# Patient Record
Sex: FEMALE | Race: WHITE | NOT HISPANIC OR LATINO
[De-identification: names, ages, dates, MRNs, and addresses within clinical notes are randomized per-mention and may not be internally consistent; named-entity substitution may affect disease eponyms.]

---
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
## 2019-11-20 ENCOUNTER — RESULT REVIEW (OUTPATIENT)
Age: 19
End: 2019-11-20

## 2020-08-29 ENCOUNTER — TRANSCRIPTION ENCOUNTER (OUTPATIENT)
Age: 20
End: 2020-08-29

## 2020-11-24 ENCOUNTER — RESULT REVIEW (OUTPATIENT)
Age: 20
End: 2020-11-24

## 2021-05-21 ENCOUNTER — RESULT REVIEW (OUTPATIENT)
Age: 21
End: 2021-05-21

## 2021-12-27 ENCOUNTER — RESULT REVIEW (OUTPATIENT)
Age: 21
End: 2021-12-27

## 2022-06-23 ENCOUNTER — FORM ENCOUNTER (OUTPATIENT)
Age: 22
End: 2022-06-23

## 2022-06-24 ENCOUNTER — APPOINTMENT (OUTPATIENT)
Dept: MRI IMAGING | Facility: CLINIC | Age: 22
End: 2022-06-24
Payer: COMMERCIAL

## 2022-06-24 ENCOUNTER — APPOINTMENT (OUTPATIENT)
Dept: ORTHOPEDIC SURGERY | Facility: CLINIC | Age: 22
End: 2022-06-24
Payer: COMMERCIAL

## 2022-06-24 VITALS — WEIGHT: 163 LBS | HEIGHT: 66 IN | BODY MASS INDEX: 26.2 KG/M2

## 2022-06-24 PROCEDURE — 73721 MRI JNT OF LWR EXTRE W/O DYE: CPT | Mod: RT

## 2022-06-24 PROCEDURE — 73718 MRI LOWER EXTREMITY W/O DYE: CPT | Mod: RT

## 2022-06-24 PROCEDURE — 99214 OFFICE O/P EST MOD 30 MIN: CPT

## 2022-06-24 RX ORDER — NAPROXEN 500 MG/1
500 TABLET ORAL TWICE DAILY
Qty: 60 | Refills: 1 | Status: ACTIVE | COMMUNITY
Start: 2022-06-24 | End: 1900-01-01

## 2022-06-24 NOTE — PHYSICAL EXAM
[de-identified] : Neurologic: normal coordination, normal DTR UE/LE , normal sensation, normal mood and affect, orientated and able to communicate. \par Skin: normal skin, no rash, no ulcers and no lesions. \par Lymphatic: no obvious lymphadenopathy in areas examined. \par Constitutional: well developed and well nourished. \par Cardiovascular: peripheral vascular exam is grossly normal. \par Pulmonary: no respiratory distress, lungs clear to auscultation bilaterally. \par Abdomen: normal bowel sounds, non-tender, no HSM and no mass. \par Gait: Crutches used to ambulate\par \par Right Leg: Unable to straight leg raise\par Pain with abduction\par Pain with external rotation\par Hip Flexion: 30 degrees. Guarded further.\par [place] 6/20/22: 3-view right hip x-ray: Unremarkable.\par \par Right Knee: Questionable defect to suprapatellar region for quadriceps tear\par [place] 6/20/22: 3+-view right knee x-ray: Unremarkable.\par

## 2022-06-24 NOTE — DISCUSSION/SUMMARY
[de-identified] : RTC after PELVIS MRI, RIGHT KNEE MRI, and FEMUR MRI to eval acute traumatic quadriceps tear due to recent injury. \par her pain necessitates all three as she cannot straight leg raise with hip pain, diffuse anterior thigh pain as well as palpable distal quadriceps pain\par in office with crutches\par Naprosyn prescribed\par \par \par -----------------------------------------------\par Home Exercise\par The patient is instructed on a home exercise program.\par \par FIGUEROA BERG Acting as a Scribe for Dr. Escobar\par I, Figueroa Berg, attest that this documentation has been prepared under the direction and in the presence of Provider Adria Escobar MD.\par \par Activity Modification\par The patient was advised to modify their activities.\par \par Dx / Natural History\par The patient was advised of the diagnosis.  The natural history of the pathology was explained in full to the patient in layman's terms.  Several different treatment options were discussed and explained in full to the patient including the risks and benefits of both surgical and non-surgical treatments.  All questions and concerns were answered.\par \par Pain Guide Activities\par The patient was advised to let pain guide the gradual advancement of activities.\par \par RICE\mariella I explained to the patient that rest, ice, compression, and elevation would benefit them.  They may return to activity after follow-up or when they no longer have any pain.

## 2022-06-29 ENCOUNTER — APPOINTMENT (OUTPATIENT)
Dept: ORTHOPEDIC SURGERY | Facility: CLINIC | Age: 22
End: 2022-06-29
Payer: COMMERCIAL

## 2022-06-29 PROCEDURE — 99214 OFFICE O/P EST MOD 30 MIN: CPT

## 2022-06-29 NOTE — DISCUSSION/SUMMARY
[de-identified] : MRI reviewed. All questions and concerns answered.\par Patient will begin PT for rectus femoris tear.\par Continue naprosyn as previously prescribed.\par RICE, pain guide activities, activity modification.\par Fu in 6 weeks. \par \par \par Home Exercise\par The patient is instructed on a home exercise program.\par \par Activity Modification\par The patient was advised to modify their activities.\par \par Dx / Natural History\par The patient was advised of the diagnosis. The natural history of the pathology was explained in full to the patient in layman's terms. Several different treatment options were discussed and explained in full to the patient including the risks and benefits of both surgical and non-surgical treatments. All questions and concerns were answered.\par \par Pain Guide Activities\par The patient was advised to let pain guide the gradual advancement of activities.\par \par RICE \par I explained to the patient that rest, ice, compression, and elevation would benefit them.  They may return to activity after follow-up or when they no longer have any pain.\par \par \par Samir Morton PA-C Acting as a Scribe for Dr. Escobar.\par

## 2022-06-29 NOTE — PHYSICAL EXAM
[de-identified] : Neurologic: normal coordination, normal DTR UE/LE , normal sensation, normal mood and affect, orientated and able to communicate. \par Skin: normal skin, no rash, no ulcers and no lesions. \par Lymphatic: no obvious lymphadenopathy in areas examined. \par Constitutional: well developed and well nourished. \par Cardiovascular: peripheral vascular exam is grossly normal. \par Pulmonary: no respiratory distress, lungs clear to auscultation bilaterally. \par Gait: Crutches used to ambulate\par \par Right Leg: minimal straight leg raise without resistance\par Pain with abduction\par Pain with external rotation\par Hip Flexion: 30 degrees. Guarded further.\par [place] 6/20/22: 3-view right hip x-ray: Unremarkable.\par \par Right Knee: medial distal thigh tenderness\par \par

## 2022-06-29 NOTE — HISTORY OF PRESENT ILLNESS
[de-identified] : The patient is a 22 year old R hand dominant female who presents today complaining of R hip and thigh pain. Here for MRI review. Continued thigh pain and difficulty ambulating. 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

## 2022-08-15 ENCOUNTER — APPOINTMENT (OUTPATIENT)
Dept: ORTHOPEDIC SURGERY | Facility: CLINIC | Age: 22
End: 2022-08-15

## 2022-08-15 DIAGNOSIS — M79.18 MYALGIA, OTHER SITE: ICD-10-CM

## 2022-08-15 DIAGNOSIS — Z78.9 OTHER SPECIFIED HEALTH STATUS: ICD-10-CM

## 2022-08-15 DIAGNOSIS — S76.911S STRAIN OF UNSPECIFIED MUSCLES, FASCIA AND TENDONS AT THIGH LEVEL, RIGHT THIGH, SEQUELA: ICD-10-CM

## 2022-08-15 DIAGNOSIS — S76.111A STRAIN OF RIGHT QUADRICEPS MUSCLE, FASCIA AND TENDON, INITIAL ENCOUNTER: ICD-10-CM

## 2022-08-15 DIAGNOSIS — S76.111D STRAIN OF RIGHT QUADRICEPS MUSCLE, FASCIA AND TENDON, SUBSEQUENT ENCOUNTER: ICD-10-CM

## 2022-08-15 PROCEDURE — 99214 OFFICE O/P EST MOD 30 MIN: CPT

## 2022-08-15 NOTE — PHYSICAL EXAM
[Right] : right hip [5___] : adduction 5[unfilled]/5 [2+] : posterior tibialis pulse: 2+ [de-identified] : Neurologic: normal coordination, normal DTR UE/LE , normal sensation, normal mood and affect, orientated and able to communicate.\par \par Skin: normal skin, no rash, no ulcers and no lesions.\par \par Lymphatic: no obvious lymphadenopathy in areas examined.\par \par Constitutional: well developed and well nourished.\par \par Cardiovascular: peripheral vascular exam is grossly normal.\par \par Pulmonary: no respiratory distress, lungs clear to auscultation bilaterally.\par \par Abdomen: normal bowel sounds, non-tender, no HSM and no mass.\par  [] : non-antalgic [FreeTextEntry8] : non tender throughout quad [FreeTextEntry9] : groin pain with resisted leg flexion.  [de-identified] : able to straight leg raise no pain.

## 2022-08-15 NOTE — DISCUSSION/SUMMARY
[de-identified] : Patient will continue home exercise, patient is ok to ease back into running. Will follow up if symptoms return. \par \par "Written by Flakito Campos, acting as Scribe for Adria Escobar M.D" \par \par Home Exercise \par \par  The patient is instructed on a home exercise program. \par  \par RICE \par I explained to the patient that rest, ice, compression, and elevation would benefit them.  They may return to activity after follow-up or when they no longer have any pain. \par  \par Pain Guide Activities \par The patient was advised to let pain guide the gradual advancement of activities. \par  \par Activity Modification \par The patient was advised to modify their activities. \par  \par Dx / Natural History \par The patient was advised of the diagnosis.  The natural history of the pathology was explained in full to the patient in layman's terms.  Several different treatment options were discussed and explained in full to the patient including the risks and benefits of both surgical and non-surgical treatments.  All questions and concerns were answered.\par

## 2022-08-15 NOTE — HISTORY OF PRESENT ILLNESS
[de-identified] : 8/15/22: Patient presents for follow up R hip pain. She has completed 6 weeks of pt, did not do 2x/ week due to school schedule. Reports that she feels much better, still has minimal pain in the quad at night and the knee feels unstable. Has been walking 4 miles a day. \par \par The patient is a 22 year old R hand dominant female who presents today complaining of R hip and thigh pain. Here for MRI review. Continued thigh pain and difficulty ambulating. 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

## 2022-10-23 ENCOUNTER — NON-APPOINTMENT (OUTPATIENT)
Age: 22
End: 2022-10-23

## 2023-01-16 ENCOUNTER — NON-APPOINTMENT (OUTPATIENT)
Age: 23
End: 2023-01-16

## 2023-04-11 ENCOUNTER — NON-APPOINTMENT (OUTPATIENT)
Age: 23
End: 2023-04-11

## 2023-07-05 ENCOUNTER — NON-APPOINTMENT (OUTPATIENT)
Age: 23
End: 2023-07-05

## 2023-07-22 ENCOUNTER — APPOINTMENT (OUTPATIENT)
Dept: ULTRASOUND IMAGING | Facility: CLINIC | Age: 23
End: 2023-07-22
Payer: COMMERCIAL

## 2023-07-22 ENCOUNTER — OUTPATIENT (OUTPATIENT)
Dept: OUTPATIENT SERVICES | Facility: HOSPITAL | Age: 23
LOS: 1 days | End: 2023-07-22
Payer: COMMERCIAL

## 2023-07-22 DIAGNOSIS — Z00.8 ENCOUNTER FOR OTHER GENERAL EXAMINATION: ICD-10-CM

## 2023-07-22 DIAGNOSIS — R10.13 EPIGASTRIC PAIN: ICD-10-CM

## 2023-07-22 PROCEDURE — 76700 US EXAM ABDOM COMPLETE: CPT | Mod: 26

## 2023-07-22 PROCEDURE — 76700 US EXAM ABDOM COMPLETE: CPT

## 2023-09-12 ENCOUNTER — APPOINTMENT (OUTPATIENT)
Dept: OPHTHALMOLOGY | Facility: CLINIC | Age: 23
End: 2023-09-12
Payer: SELF-PAY

## 2023-09-12 ENCOUNTER — NON-APPOINTMENT (OUTPATIENT)
Age: 23
End: 2023-09-12

## 2023-09-12 ENCOUNTER — APPOINTMENT (OUTPATIENT)
Dept: OPHTHALMOLOGY | Facility: CLINIC | Age: 23
End: 2023-09-12
Payer: COMMERCIAL

## 2023-09-12 PROCEDURE — 92015 DETERMINE REFRACTIVE STATE: CPT

## 2023-09-12 PROCEDURE — 92004 COMPRE OPH EXAM NEW PT 1/>: CPT

## 2023-10-31 ENCOUNTER — NON-APPOINTMENT (OUTPATIENT)
Age: 23
End: 2023-10-31

## 2023-11-15 ENCOUNTER — APPOINTMENT (OUTPATIENT)
Dept: CT IMAGING | Facility: CLINIC | Age: 23
End: 2023-11-15
Payer: COMMERCIAL

## 2023-11-15 ENCOUNTER — OUTPATIENT (OUTPATIENT)
Dept: OUTPATIENT SERVICES | Facility: HOSPITAL | Age: 23
LOS: 1 days | End: 2023-11-15
Payer: COMMERCIAL

## 2023-11-15 DIAGNOSIS — R11.0 NAUSEA: ICD-10-CM

## 2023-11-15 DIAGNOSIS — R14.0 ABDOMINAL DISTENSION (GASEOUS): ICD-10-CM

## 2023-11-15 DIAGNOSIS — R19.4 CHANGE IN BOWEL HABIT: ICD-10-CM

## 2023-11-15 DIAGNOSIS — Z00.8 ENCOUNTER FOR OTHER GENERAL EXAMINATION: ICD-10-CM

## 2023-11-15 DIAGNOSIS — R10.13 EPIGASTRIC PAIN: ICD-10-CM

## 2023-11-15 PROCEDURE — 74177 CT ABD & PELVIS W/CONTRAST: CPT

## 2023-11-15 PROCEDURE — 74177 CT ABD & PELVIS W/CONTRAST: CPT | Mod: 26

## 2023-11-16 ENCOUNTER — TRANSCRIPTION ENCOUNTER (OUTPATIENT)
Age: 23
End: 2023-11-16

## 2024-05-29 ENCOUNTER — APPOINTMENT (OUTPATIENT)
Dept: ULTRASOUND IMAGING | Facility: CLINIC | Age: 24
End: 2024-05-29
Payer: COMMERCIAL

## 2024-05-29 PROCEDURE — 76536 US EXAM OF HEAD AND NECK: CPT

## 2024-10-28 ENCOUNTER — EMERGENCY (EMERGENCY)
Facility: HOSPITAL | Age: 24
LOS: 1 days | Discharge: HOME | End: 2024-10-28
Attending: EMERGENCY MEDICINE | Admitting: EMERGENCY MEDICINE
Payer: COMMERCIAL

## 2024-10-28 VITALS
RESPIRATION RATE: 20 BRPM | WEIGHT: 130.07 LBS | SYSTOLIC BLOOD PRESSURE: 139 MMHG | DIASTOLIC BLOOD PRESSURE: 90 MMHG | TEMPERATURE: 98 F | HEART RATE: 81 BPM | OXYGEN SATURATION: 100 %

## 2024-10-28 LAB
ALBUMIN SERPL ELPH-MCNC: 4.1 G/DL — SIGNIFICANT CHANGE UP (ref 3.3–5)
ALP SERPL-CCNC: 51 U/L — SIGNIFICANT CHANGE UP (ref 40–120)
ALT FLD-CCNC: 27 U/L — SIGNIFICANT CHANGE UP (ref 4–33)
ANION GAP SERPL CALC-SCNC: 11 MMOL/L — SIGNIFICANT CHANGE UP (ref 7–14)
AST SERPL-CCNC: 19 U/L — SIGNIFICANT CHANGE UP (ref 4–32)
BASOPHILS # BLD AUTO: 0.06 K/UL — SIGNIFICANT CHANGE UP (ref 0–0.2)
BASOPHILS NFR BLD AUTO: 0.7 % — SIGNIFICANT CHANGE UP (ref 0–2)
BILIRUB SERPL-MCNC: 0.3 MG/DL — SIGNIFICANT CHANGE UP (ref 0.2–1.2)
BUN SERPL-MCNC: 13 MG/DL — SIGNIFICANT CHANGE UP (ref 7–23)
CALCIUM SERPL-MCNC: 8.9 MG/DL — SIGNIFICANT CHANGE UP (ref 8.4–10.5)
CHLORIDE SERPL-SCNC: 103 MMOL/L — SIGNIFICANT CHANGE UP (ref 98–107)
CO2 SERPL-SCNC: 23 MMOL/L — SIGNIFICANT CHANGE UP (ref 22–31)
CREAT SERPL-MCNC: 0.85 MG/DL — SIGNIFICANT CHANGE UP (ref 0.5–1.3)
EGFR: 97 ML/MIN/1.73M2 — SIGNIFICANT CHANGE UP
EOSINOPHIL # BLD AUTO: 0.18 K/UL — SIGNIFICANT CHANGE UP (ref 0–0.5)
EOSINOPHIL NFR BLD AUTO: 2 % — SIGNIFICANT CHANGE UP (ref 0–6)
FLUAV AG NPH QL: SIGNIFICANT CHANGE UP
FLUBV AG NPH QL: SIGNIFICANT CHANGE UP
GLUCOSE SERPL-MCNC: 99 MG/DL — SIGNIFICANT CHANGE UP (ref 70–99)
HCT VFR BLD CALC: 36.5 % — SIGNIFICANT CHANGE UP (ref 34.5–45)
HGB BLD-MCNC: 12.8 G/DL — SIGNIFICANT CHANGE UP (ref 11.5–15.5)
IANC: 4.31 K/UL — SIGNIFICANT CHANGE UP (ref 1.8–7.4)
IMM GRANULOCYTES NFR BLD AUTO: 0.3 % — SIGNIFICANT CHANGE UP (ref 0–0.9)
LYMPHOCYTES # BLD AUTO: 3.49 K/UL — HIGH (ref 1–3.3)
LYMPHOCYTES # BLD AUTO: 38.3 % — SIGNIFICANT CHANGE UP (ref 13–44)
MCHC RBC-ENTMCNC: 31.3 PG — SIGNIFICANT CHANGE UP (ref 27–34)
MCHC RBC-ENTMCNC: 35.1 GM/DL — SIGNIFICANT CHANGE UP (ref 32–36)
MCV RBC AUTO: 89.2 FL — SIGNIFICANT CHANGE UP (ref 80–100)
MONOCYTES # BLD AUTO: 1.04 K/UL — HIGH (ref 0–0.9)
MONOCYTES NFR BLD AUTO: 11.4 % — SIGNIFICANT CHANGE UP (ref 2–14)
NEUTROPHILS # BLD AUTO: 4.31 K/UL — SIGNIFICANT CHANGE UP (ref 1.8–7.4)
NEUTROPHILS NFR BLD AUTO: 47.3 % — SIGNIFICANT CHANGE UP (ref 43–77)
NRBC # BLD: 0 /100 WBCS — SIGNIFICANT CHANGE UP (ref 0–0)
NRBC # FLD: 0 K/UL — SIGNIFICANT CHANGE UP (ref 0–0)
PLATELET # BLD AUTO: 233 K/UL — SIGNIFICANT CHANGE UP (ref 150–400)
POTASSIUM SERPL-MCNC: 4.2 MMOL/L — SIGNIFICANT CHANGE UP (ref 3.5–5.3)
POTASSIUM SERPL-SCNC: 4.2 MMOL/L — SIGNIFICANT CHANGE UP (ref 3.5–5.3)
PROT SERPL-MCNC: 6.6 G/DL — SIGNIFICANT CHANGE UP (ref 6–8.3)
RBC # BLD: 4.09 M/UL — SIGNIFICANT CHANGE UP (ref 3.8–5.2)
RBC # FLD: 11.1 % — SIGNIFICANT CHANGE UP (ref 10.3–14.5)
RSV RNA NPH QL NAA+NON-PROBE: SIGNIFICANT CHANGE UP
SARS-COV-2 RNA SPEC QL NAA+PROBE: SIGNIFICANT CHANGE UP
SODIUM SERPL-SCNC: 137 MMOL/L — SIGNIFICANT CHANGE UP (ref 135–145)
WBC # BLD: 9.11 K/UL — SIGNIFICANT CHANGE UP (ref 3.8–10.5)
WBC # FLD AUTO: 9.11 K/UL — SIGNIFICANT CHANGE UP (ref 3.8–10.5)

## 2024-10-28 RX ORDER — ACETAMINOPHEN 325 MG
650 TABLET ORAL ONCE
Refills: 0 | Status: COMPLETED | OUTPATIENT
Start: 2024-10-28 | End: 2024-10-28

## 2024-10-28 RX ORDER — SODIUM CHLORIDE IRRIG SOLUTION 0.9 %
1000 SOLUTION, IRRIGATION IRRIGATION ONCE
Refills: 0 | Status: COMPLETED | OUTPATIENT
Start: 2024-10-28 | End: 2024-10-28

## 2024-10-28 RX ADMIN — Medication 1000 MILLILITER(S): at 09:47

## 2024-10-28 RX ADMIN — Medication 650 MILLIGRAM(S): at 09:47

## 2024-10-28 NOTE — ED PROVIDER NOTE - ATTENDING CONTRIBUTION TO CARE
DR. RAMIREZ, ATTENDING MD-  I performed a face to face bedside interview with the patient regarding history of present illness, review of symptoms and past medical history. I completed an independent physical exam.  I have discussed the patient's plan of care with the resident.   Documentation as above in the note.    25 y/o female with uri sx x1 wk without improvement.  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  Eval for viral syndrome, pna.  Obtain cbc cmp cxr viral swab give ivf bolus pain med reassess.

## 2024-10-28 NOTE — ED PROVIDER NOTE - PHYSICAL EXAMINATION
Gen: NAD  Head: NCAT  Neck: FROM, no pain with ranging, no nuchal rigidity  HEENT: normal conjunctiva, PERRLA, oral mucosa moist  Lung: no respiratory distress, speaking in full sentences, slightly diminished breath sounds RLL  Abd: soft, non distended, non tender   MSK: no visible deformities  Neuro: No focal deficits, AAOx3  Skin: Warm

## 2024-10-28 NOTE — ED ADULT NURSE NOTE - OBJECTIVE STATEMENT
pt received to intake 3, aox4.  pt c/o SOB, cough and congestion x 1 week.  pt appears congested. SL placed, labs sent, awaiting further orders

## 2024-10-28 NOTE — ED ADULT TRIAGE NOTE - CHIEF COMPLAINT QUOTE
Patient c/o worsening SOB, CP, cough, congestion and chills x 1 week. Denies N/V, fevers and abdominal pain. No hx.

## 2024-10-28 NOTE — ED ADULT NURSE NOTE - NSFALLUNIVINTERV_ED_ALL_ED
Bed/Stretcher in lowest position, wheels locked, appropriate side rails in place/Call bell, personal items and telephone in reach/Instruct patient to call for assistance before getting out of bed/chair/stretcher/Non-slip footwear applied when patient is off stretcher/[place] to call system/Physically safe environment - no spills, clutter or unnecessary equipment/Purposeful proactive rounding/Room/bathroom lighting operational, light cord in reach

## 2024-10-28 NOTE — ED PROVIDER NOTE - OBJECTIVE STATEMENT
24-year-old female presenting with 1 week of fatigue, chills, cough, congestion, muscle aches.  Patient states that she has felt febrile over the past week however has not been taking temp.  Cough is nonproductive and has been worsening since onset. Pt endorsing some neck soreness over past 2 days. Has tried OTC meds with minimal relief of sx, no meds taken today. 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 No headache, chest pain, abd pain, NVD, dysuria. Flu vaccine UTD

## 2024-10-28 NOTE — ED PROVIDER NOTE - NSFOLLOWUPINSTRUCTIONS_ED_ALL_ED_FT
You were evaluated in the Emergency Department today for your cough and congestion. Your chest xray did not show evidence of a pneumonia- your cough is most likely due to a viral illness which will improve on its own with rest and fluids. You can take over the counter medications such as dextromethorphan to help manage your symptoms.    Please schedule an appointment for follow up with your primary care physician within two days.    Return to the Emergency Department if you experience worsening cough, fever 100.4 ° F or greater, recurrent vomiting, chest pain, shortness of breath, or any other concerning symptoms.    Thank you for choosing us for your care

## 2024-10-28 NOTE — ED PROVIDER NOTE - PROGRESS NOTE DETAILS
Discussed with pt results of work up, strict return precautions, and need for follow up with PCP if symptoms do not improve.  Pt expressed understanding and agrees with plan.

## 2024-10-28 NOTE — ED PROVIDER NOTE - CLINICAL SUMMARY MEDICAL DECISION MAKING FREE TEXT BOX
24-year-old female presenting with 1 week of fatigue, subjective fevers, chills, cough, congestion, muscle aches. 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 Pt afebrile and hemodynamically stable, no nuchal rigidity, slightly diminished breath sounds in RLL. Likely viral syndrome vs bacterial URI. Will order basic labs, flu/COVID PCR, CXR. Likely DC home with PCP f/up.

## 2024-10-28 NOTE — ED PROVIDER NOTE - PATIENT PORTAL LINK FT
You can access the FollowMyHealth Patient Portal offered by [facility] by registering at the following website: [email or web address]. By joining [facility]’s FollowMyHealth portal, you will also be able to view your health information using other applications (apps) compatible with our system.

## 2024-11-06 ENCOUNTER — APPOINTMENT (OUTPATIENT)
Dept: PODIATRY | Facility: CLINIC | Age: 24
End: 2024-11-06
Payer: COMMERCIAL

## 2024-11-06 ENCOUNTER — NON-APPOINTMENT (OUTPATIENT)
Age: 24
End: 2024-11-06

## 2024-11-06 VITALS — HEIGHT: 65 IN | BODY MASS INDEX: 27.49 KG/M2 | WEIGHT: 165 LBS

## 2024-11-06 DIAGNOSIS — L60.1 ONYCHOLYSIS: ICD-10-CM

## 2024-11-06 DIAGNOSIS — M79.671 PAIN IN RIGHT FOOT: ICD-10-CM

## 2024-11-06 DIAGNOSIS — L03.031 CELLULITIS OF RIGHT TOE: ICD-10-CM

## 2024-11-06 DIAGNOSIS — L03.032 CELLULITIS OF LEFT TOE: ICD-10-CM

## 2024-11-06 PROCEDURE — 99204 OFFICE O/P NEW MOD 45 MIN: CPT | Mod: 25

## 2024-11-06 PROCEDURE — 11730 AVULSION NAIL PLATE SIMPLE 1: CPT | Mod: RT

## 2024-11-06 PROCEDURE — 73630 X-RAY EXAM OF FOOT: CPT | Mod: RT

## 2024-11-06 PROCEDURE — 11732 AVLSN NAIL PLATE SIMPLE EACH: CPT | Mod: LT

## 2024-11-06 RX ORDER — ESCITALOPRAM OXALATE 5 MG/1
5 TABLET, FILM COATED ORAL
Refills: 0 | Status: ACTIVE | COMMUNITY

## 2024-11-06 RX ORDER — BUPROPION HYDROCHLORIDE 100 MG/1
100 TABLET, FILM COATED ORAL
Refills: 0 | Status: ACTIVE | COMMUNITY

## 2024-11-06 RX ORDER — NORETHINDRONE ACETATE AND ETHINYL ESTRADIOL AND FERROUS FUMARATE 1MG-20(24)
KIT ORAL
Refills: 0 | Status: ACTIVE | COMMUNITY

## 2024-11-18 ENCOUNTER — APPOINTMENT (OUTPATIENT)
Dept: PODIATRY | Facility: CLINIC | Age: 24
End: 2024-11-18
Payer: COMMERCIAL

## 2024-11-18 DIAGNOSIS — L03.032 CELLULITIS OF LEFT TOE: ICD-10-CM

## 2024-11-18 DIAGNOSIS — L60.1 ONYCHOLYSIS: ICD-10-CM

## 2024-11-18 PROCEDURE — 99213 OFFICE O/P EST LOW 20 MIN: CPT

## 2025-03-08 ENCOUNTER — EMERGENCY (EMERGENCY)
Facility: HOSPITAL | Age: 25
LOS: 1 days | Discharge: HOME | End: 2025-03-08
Attending: EMERGENCY MEDICINE | Admitting: EMERGENCY MEDICINE
Payer: OTHER MISCELLANEOUS

## 2025-03-08 VITALS
OXYGEN SATURATION: 98 % | WEIGHT: 166.89 LBS | RESPIRATION RATE: 14 BRPM | HEIGHT: 66 IN | SYSTOLIC BLOOD PRESSURE: 125 MMHG | TEMPERATURE: 98 F | DIASTOLIC BLOOD PRESSURE: 84 MMHG | HEART RATE: 69 BPM

## 2025-03-08 PROCEDURE — 99284 EMERGENCY DEPT VISIT MOD MDM: CPT

## 2025-03-08 PROCEDURE — 99053 MED SERV 10PM-8AM 24 HR FAC: CPT

## 2025-03-08 NOTE — ED PROVIDER NOTE - OBJECTIVE STATEMENT
24 year old presents s/p needlestick while deaccessing a port. patient known . labs sent. patient low risk for blood borne illness

## 2025-03-08 NOTE — ED PROVIDER NOTE - ED STEMI HIDDEN
Physical Therapy Evaluation     Patient's Name: Jf Valera    Admitting Diagnosis  dvt of portal vein    Problem List  Patient Active Problem List   Diagnosis    Cataracts, bilateral    HTN (hypertension)    TIA (transient ischemic attack)    Protein calorie malnutrition (HCC)    S/P exploratory laparotomy    Mesenteric ischemia (HCC)    Portal vein thrombosis    H/O prostate cancer    Palliative care by specialist    Goals of care, counseling/discussion       Past Medical History  Past Medical History:   Diagnosis Date    Hypertension        Past Surgical History  Past Surgical History:   Procedure Laterality Date    LAPAROTOMY N/A 3/13/2024    Procedure: LAPAROTOMY EXPLORATORY; SMALL BOWEL RESECTION; LYSIS OF ADHESIONS; SUPRPAPUBIC TUBE INSERTION;  Surgeon: Marvin Azul MD;  Location: [facility]IN OR;  Service: General    LAPAROTOMY N/A 3/14/2024    Procedure: EXPLORATORY LAPAROTOMY, WASHOUT. SMALL BOWEL ANASTOMOSIS, CONTROL OF LIVER BLEED, CLOSURE OF ABDOMINAL WOUND;  Surgeon: Beltran Lorenz DO;  Location:  MAIN OR;  Service: General          03/17/24 1002   PT Last Visit   PT Visit Date 03/17/24   Note Type   Note type Evaluation   Pain Assessment   Pain Assessment Tool FLACC   Pain Location/Orientation Orientation: Left;Other (Comment)   Effect of Pain on Daily Activities pain on L side of throat w/ swallowing   Patient's Stated Pain Goal No pain   Pain Rating: FLACC (Rest) - Face 1   Pain Rating: FLACC (Rest) - Legs 0   Pain Rating: FLACC (Rest) - Activity 0   Pain Rating: FLACC (Rest) - Cry 0   Pain Rating: FLACC (Rest) - Consolability 0   Score: FLACC (Rest) 1   Pain Rating: FLACC (Activity) - Face 1   Pain Rating: FLACC (Activity) - Legs 0   Pain Rating: FLACC (Activity) - Activity 0   Pain Rating: FLACC (Activity) - Cry 0   Pain Rating: FLACC (Activity) - Consolability 0   Score: FLACC (Activity) 1   Restrictions/Precautions   Weight Bearing Precautions Per Order No   Other Precautions  Telemetry;Multiple lines;Fall Risk;Pain;O2  (NGT to suction; 3L O2; suprapubic cath)   Home Living   Type of Home House   Home Layout Two level;Bed/bath upstairs;1/2 bath on main level  (2-3 LAKESHA - FFOS to 2nd)   Bathroom Shower/Tub Tub/shower unit   Bathroom Toilet Standard   Bathroom Equipment   (denies)   Bathroom Accessibility Accessible   Home Equipment   (denies)   Prior Function   Level of [place] Independent with ADLs;Independent with functional mobility;Needs assistance with IADLS   Lives With Spouse   Receives Help From Family   IADLs Family/Friend/Other provides transportation;Family/Friend/Other provides meals;Family/Friend/Other provides medication management  (pt is I w/ laundry, spouse cooks; hired help for cleaning and yardwork; (-) )   Falls in the last 6 months 0   Vocational Retired   General   Family/Caregiver Present Yes  (son)   Cognition   Overall Cognitive Status WFL   Arousal/Participation Alert   Attention Within functional limits   Orientation Level Oriented X4   Memory Decreased recall of precautions   Following Commands Follows one step commands without difficulty   Comments Patient pleasant and cooperative   Subjective   Subjective Patient agreeable to PT eval   RUE Assessment   RUE Assessment WFL   LUE Assessment   LUE Assessment WFL   RLE Assessment   RLE Assessment X   Strength RLE   RLE Overall Strength 4-/5   LLE Assessment   LLE Assessment X   Strength LLE   LLE Overall Strength 4-/5   Bed Mobility   Additional Comments OOB in chair on arrival   Transfers   Sit to Stand 4  Minimal assistance   Additional items Assist x 1;Increased time required;Verbal cues   Stand to Sit 4  Minimal assistance   Additional items Assist x 1;Increased time required;Verbal cues   Additional Comments RW   Ambulation/Elevation   Gait pattern Improper Weight shift;Shuffling;Short stride;Excessively slow;Decreased foot clearance   Gait Assistance 4  Minimal assist   Additional items Assist x  1;Verbal cues   Assistive Device Rolling walker   Distance 70'x2   Ambulation/Elevation Additional Comments +instability, x2 minor LOB requiring Min A to regain balance   Balance   Static Sitting Fair   Dynamic Sitting Fair -   Static Standing Poor +   Dynamic Standing Poor +   Ambulatory Poor +   Endurance Deficit   Endurance Deficit Yes   Endurance Deficit Description fatigue, weakness   Activity Tolerance   Activity Tolerance Patient limited by fatigue;Patient limited by pain   Medical Staff Made Aware OT   Nurse Made Aware RN cleared   Assessment   Prognosis Good   Problem List Decreased strength;Decreased endurance;Impaired balance;Decreased mobility;Pain   Assessment Pt is a 86 y.o. male seen for a high complexity PT evaluation due to Ongoing medical management for primary dx, Increased reliance on more restrictive AD compared to baseline, Decreased activity tolerance compared to baseline, Fall risk, Increased assistance needed from caregiver at current time, Increased O2 via NC from pts baseline, s/p surgical intervention. Patient is s/p admit to [facility] on 3/13/2024 for dvt of portal vein. Patient  has a past medical history of Hypertension..     PT now consulted to assess functional mobility and needs for safe d/c planning. Prior to admission, pt independent with functional mobility, independent ADLs, and needs assistance IADLs. Personal factors affecting status include fall risk, steps to enter home, steps to negotiate within home, and medical status     Currently pt requires minimal assistance x1 for functional transfers with rolling walker ; minimal assistance x1 for ambulation with rolling walker. Pt presents functioning below baseline and w/ overall mobility deficits 2* to: decreased strength, decreased endurance, decreased mobility, impaired balance. These impairments place pt at risk for falls.     Pt will continue to benefit from skilled PT interventions to address stated impairments;  to maximize functional potential; for ongoing pt/family education; and DME needs. The patient's AM-PAC Basic Mobility Inpatient Short Form Raw Score Is 15. PT is currently recommending Level 3 - Minimum Resource Intensity on d/c from hospital. Will continue to follow as able.   Goals   Patient Goals to be independent   STG Expiration Date 03/31/24   Short Term Goal #1 In 14 days, patient will 1) increase strength in BUE/BLE by 1/2 to 1 full grade for increased strength and stability needed for functional mobility 2) improve bed mobility to MI for improved mobility and decreased need for assist 3) sit EOB x30' with MI to facilitate trunk stability and safety for completion of ADL tasks 4) increase functional transfers to MI for improved safety and functional mobility 5) ambulate 250ft with MI using LRAD for increased endurance and safety ambulating home and community environments 6) improve balance by 1 grade for improved safety and stability and decreased risk for falls. 7) ascend/descend at least 12 stairs using HR with MI in order to safely enter home   PT Treatment Day 0   Plan   Treatment/Interventions ADL retraining;Functional transfer training;LE strengthening/ROM;Therapeutic exercise;Endurance training;Patient/family training;Equipment eval/education;Bed mobility;Gait training;Spoke to nursing;Spoke to case management;OT;Elevations   PT Frequency 3-5x/wk   Discharge Recommendation   Rehab Resource Intensity Level, PT III (Minimum Resource Intensity)   Equipment Recommended Walker   Walker Package Recommended Wheeled walker   AM-PAC Basic Mobility Inpatient   Turning in Flat Bed Without Bedrails 2   Lying on Back to Sitting on Edge of Flat Bed Without Bedrails 2   Moving Bed to Chair 3   Standing Up From Chair Using Arms 3   Walk in Room 3   Climb 3-5 Stairs With Railing 2   Basic Mobility Inpatient Raw Score 15   Basic Mobility Standardized Score 36.97   [facility] Highest Level Of Mobility   -[facility] Goal 4:  Move to chair/commode   YIMI-ALIYA Achieved 7: Walk 25 feet or more   Modified Reymundo Scale   Modified Reymundo Scale 4   End of Consult   Patient Position at End of Consult All needs within reach;Bedside chair         Kayla Rojas, PT, DPT     hide

## 2025-03-08 NOTE — ED PROVIDER NOTE - PATIENT PORTAL LINK FT
You can access the FollowMyHealth Patient Portal offered by [facility] by registering at the following website: [email or web address]. By joining [facility]’s FollowMyHealth portal, you will also be able to view your health information using other applications (apps) compatible with our system.

## 2025-03-08 NOTE — ED ADULT NURSE NOTE - OBJECTIVE STATEMENT
Pt is A&Ox4 and ambulatory at baseline. Pt works in [employer] and had a needle stick when accessing a patients port. Pt is not actively bleeding. Pt is not pallor or diaphoretic. Respirations are equal and unlabored bilaterally. Labs drawn and sent. Stretcher is in the lowest position and safety maintained.

## 2025-03-08 NOTE — ED ADULT NURSE NOTE - NSFALLUNIVINTERV_ED_ALL_ED
Bed/Stretcher in lowest position, wheels locked, appropriate side rails in place/Call bell, personal items and telephone in reach/Instruct patient to call for assistance before getting out of bed/chair/stretcher/Non-slip footwear applied when patient is off stretcher/[place] to call system/Physically safe environment - no spills, clutter or unnecessary equipment/Purposeful proactive rounding/Room/bathroom lighting operational, light cord in reach

## 2025-03-08 NOTE — ED PROVIDER NOTE - NSFOLLOWUPINSTRUCTIONS_ED_ALL_ED_FT
No signs of emergency medical condition on today's workup.  Presumptive diagnosis made, but further evaluation may be required by your primary care doctor or specialist for a definitive diagnosis.  Therefore, follow up as directed and if symptoms change/worsen or any emergency conditions, please return to the ER.  Please follow up with your primary care doctor after you leave the emergency department so that they can follow up and conduct more testing and treatment as they deem necessary. If you have worsening signs or symptoms of what you came in to the Emergency Department today and are not able to see your doctor, go to your nearest emergency department or return to the [facility] emergency department for further care and management.    - Lab and imaging results, if performed, were discussed with you along with your discharge diagnosis    - Follow up with your doctor in 1 week - bring copies of your results if you were given. If you do not have a primary doctor, please call [phone] to find one convenient for you    - Return to the ED for any new, worsening, or concerning symptoms to you    - Continue all prescribed medications    - Take ibuprofen/tylenol as directed as needed for pain    - Rest and keep yourself hydrated with fluids    To control your pain at home, you should take Ibuprofen 400 mg along with Tylenol 650mg-1000mg every 6 to 8 hours. Limit your maximum daily Tylenol from all sources to 4000mg. Be aware that many other medications contain acetaminophen which is also known as Tylenol. Taking Tylenol and Ibuprofen together has been shown to be more effective at relieving pain than taking them separately. These are both over the counter medications that you can  at your local pharmacy without a prescription. You need to respect all of the warnings on the bottles. You shouldn’t take these medications for more than a week without following up with your doctor. Both medications come with certain risks and side effects that you need to discuss with your doctor, especially if you are taking them for a prolonged period.

## 2025-03-08 NOTE — ED PROVIDER NOTE - NS ED ATTENDING STATEMENT MOD
HOSPITAL COURSE AND ASSESSMENT    Patient with moderate MR admitted with uncontrolled diabetes and hematemesis. Pt was treated in ICU with insulin gtt for tight glycemic control.   CTAP c/w stercoral colitis/esophagitis. Course complicated by anemia, for which no NOK was able to be notified. Group home also unable to consent, so 2 physician consent completed for blood transfusion. EGD 9/6 showed severe esophagitis, no active bleeding.    Medically stable for discharge, but group home will not take patients Friday, Saturday, Sunday. Anticipate discharge Monday      #Acute blood loss anemia due to UGIB/severe esophagitis  -Hb stable s/p 2 unit PRBCS  -EGD 9/6  -ppi BID  -resume ASA    *Probable aspiration pneumonia with leukocytosis  LLL infiltrate   vanco and start zosyn for suspected GN rods and other poss resistant organisms, deescalated to augmentin BID    DM-2 with vascular complication    Constipation/Stercoral colitis-resolved      NOK Shira at [phone] and brother [phone]    ----------------------------------------------------------------------------------------------  CHIEF COMPLAINT:  GIB, pneumonia    SUBJECTIVE:     tolerating PO. OOB. no cough    REVIEW OF SYSTEMS:  ltd due to mental status    Vital Signs Last 24 Hrs  T(C): 36.2 (08 Sep 2018 05:11), Max: 36.9 (07 Sep 2018 16:56)  T(F): 97.2 (08 Sep 2018 05:11), Max: 98.4 (07 Sep 2018 16:56)  HR: 86 (08 Sep 2018 05:11) (62 - 86)  BP: 122/75 (08 Sep 2018 05:11) (95/64 - 122/75)  BP(mean): --  RR: 18 (08 Sep 2018 05:11) (18 - 18)  SpO2: 98% (08 Sep 2018 05:11) (98% - 98%)  CAPILLARY BLOOD GLUCOSE      POCT Blood Glucose.: 166 mg/dL (08 Sep 2018 08:34)  POCT Blood Glucose.: 187 mg/dL (07 Sep 2018 21:17)  POCT Blood Glucose.: 202 mg/dL (07 Sep 2018 17:54)  POCT Blood Glucose.: 273 mg/dL (07 Sep 2018 12:48)      PHYSICAL EXAM:  Constitutional: NAD, NCAT  HEENT: EOMI, Normal Hearing, MMM, fair dentition  Neck: trachea midline, No JVD  Respiratory: Breath sounds are coarse, unlabored respiration  Cardiovascular: S1 and S2, regular rate   Gastrointestinal: Bowel Sounds present, soft, nontender, nondistended  Extremities: No peripheral edema  Vascular: 2+ radial pulse  Neurological: awake, alert, follows commands, sensation grossly intact  Psych: appropriate affect,  insight  Musculoskeletal: moves all extremities  Skin: No rashes    ALLERGIES  No Known Allergies      MEDICATIONS  (STANDING):  amoxicillin  875 milliGRAM(s)/clavulanate 1 Tablet(s) Oral two times a day  ARIPiprazole 30 milliGRAM(s) Oral daily  aspirin enteric coated 81 milliGRAM(s) Oral daily  carbidopa/levodopa  25/100 1 Tablet(s) Oral four times a day  dextrose 5%. 1000 milliLiter(s) (50 mL/Hr) IV Continuous <Continuous>  dextrose 50% Injectable 50 milliLiter(s) IV Push every 15 minutes  dextrose 50% Injectable 25 milliLiter(s) IV Push every 15 minutes  diVALproex ER 1000 milliGRAM(s) Oral daily  diVALproex ER 1000 milliGRAM(s) Oral at bedtime  docusate sodium 100 milliGRAM(s) Oral two times a day  escitalopram 20 milliGRAM(s) Oral daily  insulin glargine Injectable (LANTUS) 7 Unit(s) SubCutaneous at bedtime  insulin lispro (HumaLOG) corrective regimen sliding scale   SubCutaneous three times a day before meals  insulin lispro (HumaLOG) corrective regimen sliding scale   SubCutaneous at bedtime  levETIRAcetam 250 milliGRAM(s) Oral two times a day  mineral oil enema 133 milliLiter(s) Rectal two times a day  oxybutynin 10 milliGRAM(s) Oral at bedtime  pantoprazole    Tablet 40 milliGRAM(s) Oral two times a day  polyethylene glycol 3350 17 Gram(s) Oral two times a day  QUEtiapine 50 milliGRAM(s) Oral daily  rOPINIRole 0.75 milliGRAM(s) Oral three times a day  tamsulosin 0.4 milliGRAM(s) Oral at bedtime      LABS: All Labs Reviewed:                        10.1   5.22  )-----------( 236      ( 08 Sep 2018 05:04 )             30.0     09-08    137  |  101  |  12  ----------------------------<  166<H>  4.0   |  30  |  0.56    Ca    8.6      08 Sep 2018 05:04            Blood Culture: Attending Only

## 2025-08-09 ENCOUNTER — EMERGENCY (EMERGENCY)
Facility: HOSPITAL | Age: 25
LOS: 1 days | End: 2025-08-09
Attending: EMERGENCY MEDICINE | Admitting: EMERGENCY MEDICINE
Payer: COMMERCIAL

## 2025-08-09 VITALS
DIASTOLIC BLOOD PRESSURE: 95 MMHG | OXYGEN SATURATION: 98 % | SYSTOLIC BLOOD PRESSURE: 135 MMHG | HEART RATE: 79 BPM | TEMPERATURE: 99 F | RESPIRATION RATE: 16 BRPM

## 2025-08-09 LAB
APPEARANCE UR: CLEAR — SIGNIFICANT CHANGE UP
BILIRUB UR-MCNC: NEGATIVE — SIGNIFICANT CHANGE UP
COLOR SPEC: YELLOW — SIGNIFICANT CHANGE UP
DIFF PNL FLD: NEGATIVE — SIGNIFICANT CHANGE UP
GLUCOSE UR QL: NEGATIVE MG/DL — SIGNIFICANT CHANGE UP
KETONES UR QL: NEGATIVE MG/DL — SIGNIFICANT CHANGE UP
LEUKOCYTE ESTERASE UR-ACNC: NEGATIVE — SIGNIFICANT CHANGE UP
NITRITE UR-MCNC: NEGATIVE — SIGNIFICANT CHANGE UP
PH UR: 7 — SIGNIFICANT CHANGE UP (ref 5–8)
PROT UR-MCNC: NEGATIVE MG/DL — SIGNIFICANT CHANGE UP
SP GR SPEC: 1.01 — SIGNIFICANT CHANGE UP (ref 1–1.03)
UROBILINOGEN FLD QL: 0.2 MG/DL — SIGNIFICANT CHANGE UP (ref 0.2–1)

## 2025-08-09 PROCEDURE — 99053 MED SERV 10PM-8AM 24 HR FAC: CPT

## 2025-08-09 PROCEDURE — 99284 EMERGENCY DEPT VISIT MOD MDM: CPT

## 2025-08-09 RX ADMIN — Medication 1000 MILLIGRAM(S): at 03:06
